# Patient Record
Sex: FEMALE | Race: WHITE | NOT HISPANIC OR LATINO | Employment: OTHER | URBAN - METROPOLITAN AREA
[De-identification: names, ages, dates, MRNs, and addresses within clinical notes are randomized per-mention and may not be internally consistent; named-entity substitution may affect disease eponyms.]

---

## 2017-11-21 ENCOUNTER — ALLSCRIPTS OFFICE VISIT (OUTPATIENT)
Dept: OTHER | Facility: OTHER | Age: 72
End: 2017-11-21

## 2017-11-21 LAB
BILIRUB UR QL STRIP: NORMAL
CLARITY UR: NORMAL
COLOR UR: YELLOW
GLUCOSE (HISTORICAL): NORMAL
HGB UR QL STRIP.AUTO: NORMAL
KETONES UR STRIP-MCNC: NORMAL MG/DL
LEUKOCYTE ESTERASE UR QL STRIP: NORMAL
NITRITE UR QL STRIP: NORMAL
PH UR STRIP.AUTO: 5 [PH]
PROT UR STRIP-MCNC: NORMAL MG/DL
SP GR UR STRIP.AUTO: 1.03
UROBILINOGEN UR QL STRIP.AUTO: 0.2

## 2018-01-13 VITALS
DIASTOLIC BLOOD PRESSURE: 80 MMHG | SYSTOLIC BLOOD PRESSURE: 140 MMHG | WEIGHT: 154 LBS | BODY MASS INDEX: 22.81 KG/M2 | HEIGHT: 69 IN

## 2018-11-19 RX ORDER — OLMESARTAN MEDOXOMIL 20 MG/1
TABLET ORAL DAILY
COMMUNITY

## 2018-11-23 RX ORDER — TOBRAMYCIN AND DEXAMETHASONE 3; 1 MG/ML; MG/ML
SUSPENSION/ DROPS OPHTHALMIC
Refills: 0 | COMMUNITY
Start: 2018-10-25

## 2018-11-26 ENCOUNTER — OFFICE VISIT (OUTPATIENT)
Dept: UROLOGY | Facility: MEDICAL CENTER | Age: 73
End: 2018-11-26
Payer: MEDICARE

## 2018-11-26 VITALS
DIASTOLIC BLOOD PRESSURE: 72 MMHG | WEIGHT: 153 LBS | HEIGHT: 70 IN | SYSTOLIC BLOOD PRESSURE: 116 MMHG | BODY MASS INDEX: 21.9 KG/M2

## 2018-11-26 DIAGNOSIS — R31.29 MICROHEMATURIA: Primary | ICD-10-CM

## 2018-11-26 LAB
SL AMB  POCT GLUCOSE, UA: ABNORMAL
SL AMB LEUKOCYTE ESTERASE,UA: ABNORMAL
SL AMB POCT BILIRUBIN,UA: ABNORMAL
SL AMB POCT BLOOD,UA: ABNORMAL
SL AMB POCT CLARITY,UA: CLEAR
SL AMB POCT COLOR,UA: YELLOW
SL AMB POCT KETONES,UA: ABNORMAL
SL AMB POCT NITRITE,UA: ABNORMAL
SL AMB POCT PH,UA: 5.5
SL AMB POCT SPECIFIC GRAVITY,UA: 1.01
SL AMB POCT URINE PROTEIN: ABNORMAL
SL AMB POCT UROBILINOGEN: 0.2

## 2018-11-26 PROCEDURE — 81003 URINALYSIS AUTO W/O SCOPE: CPT | Performed by: UROLOGY

## 2018-11-26 PROCEDURE — 99213 OFFICE O/P EST LOW 20 MIN: CPT | Performed by: UROLOGY

## 2018-11-26 RX ORDER — KRILL/OM-3/DHA/EPA/PHOSPHO/AST 500MG-86MG
1 CAPSULE ORAL
COMMUNITY

## 2018-11-26 RX ORDER — OLMESARTAN MEDOXOMIL 20 MG/1
20 TABLET ORAL
COMMUNITY
Start: 2007-07-17

## 2018-11-26 NOTE — PROGRESS NOTES
Assessment/Plan:   Long-term microscopic hematuria, no change in the amount  Certainly no symptoms  Does not need further evaluation, and we will make our visits only as needed if something new comes up  P r n  Diagnoses and all orders for this visit:    Microhematuria  -     POCT urine dip auto non-scope    Other orders  -     tobramycin-dexamethasone (TOBRADEX) ophthalmic suspension; INSTILL 1 DROP INTO THE RIGHT EYE 4 TIMES A DAY FOR 7-10 DAYS  -     BIOTIN PO; Take 1,000 mcg by mouth  -     Cholecalciferol 1000 units capsule; Take by mouth  -     GARLIC OIL PO; Take 1 tablet by mouth  -     Glucosamine-Chondroitin (GLUCOSAMINE CHONDR COMPLEX PO); Take 1 tablet by mouth  -     Krill Oil 500 MG CAPS; Take 1 tablet by mouth  -     Magnesium Hydroxide (GOODEN PO); Take by mouth  -     MAGNESIUM PO; Take 500 mg by mouth  -     Multiple Vitamins-Minerals (MULTIVITAMIN ADULT PO); Take 1 tablet by mouth  -     Multiple Vitamins-Minerals (OCUVITE EXTRA PO); Take 1 tablet by mouth  -     olmesartan (BENICAR) 20 mg tablet; Take 20 mg by mouth          Subjective:     Patient ID: Christiano Officer is a 68 y o  female  Follow-up for long-term micro hematuria  Has not had any symptoms, no burning urgency frequency or gross hematuria  Good stream and control  Had cystoscopies and kidney imaging years ago  No risk factors, never smoked or heavy duty chemical exposure  Review of Systems   All other systems reviewed and are negative  Objective:     Physical Exam   Constitutional: She appears well-developed and well-nourished

## 2018-11-26 NOTE — LETTER
November 26, 2018     [id number]    Patient: Ramon Pineda   YOB: 1945   Date of Visit: 11/26/2018       Dear Dr Lola Boo: Thank you for referring Sandy Molina to me for evaluation  Below are my notes for this consultation  If you have questions, please do not hesitate to call me  I look forward to following your patient along with you  Sincerely,        Mabelene Kocher, MD        CC: No Recipients  Mabelene Kocher, MD  11/26/2018 11:20 AM  Sign at close encounter  Assessment/Plan:   Long-term microscopic hematuria, no change in the amount  Certainly no symptoms  Does not need further evaluation, and we will make our visits only as needed if something new comes up  P r n  Diagnoses and all orders for this visit:    Microhematuria  -     POCT urine dip auto non-scope    Other orders  -     tobramycin-dexamethasone (TOBRADEX) ophthalmic suspension; INSTILL 1 DROP INTO THE RIGHT EYE 4 TIMES A DAY FOR 7-10 DAYS  -     BIOTIN PO; Take 1,000 mcg by mouth  -     Cholecalciferol 1000 units capsule; Take by mouth  -     GARLIC OIL PO; Take 1 tablet by mouth  -     Glucosamine-Chondroitin (GLUCOSAMINE CHONDR COMPLEX PO); Take 1 tablet by mouth  -     Krill Oil 500 MG CAPS; Take 1 tablet by mouth  -     Magnesium Hydroxide (GOODEN PO); Take by mouth  -     MAGNESIUM PO; Take 500 mg by mouth  -     Multiple Vitamins-Minerals (MULTIVITAMIN ADULT PO); Take 1 tablet by mouth  -     Multiple Vitamins-Minerals (OCUVITE EXTRA PO); Take 1 tablet by mouth  -     olmesartan (BENICAR) 20 mg tablet; Take 20 mg by mouth          Subjective:     Patient ID: Ramon Pineda is a 68 y o  female  Follow-up for long-term micro hematuria  Has not had any symptoms, no burning urgency frequency or gross hematuria  Good stream and control  Had cystoscopies and kidney imaging years ago  No risk factors, never smoked or heavy duty chemical exposure          Review of Systems   All other systems reviewed and are negative  Objective:     Physical Exam   Constitutional: She appears well-developed and well-nourished